# Patient Record
Sex: MALE | Race: BLACK OR AFRICAN AMERICAN | NOT HISPANIC OR LATINO | URBAN - METROPOLITAN AREA
[De-identification: names, ages, dates, MRNs, and addresses within clinical notes are randomized per-mention and may not be internally consistent; named-entity substitution may affect disease eponyms.]

---

## 2021-09-23 ENCOUNTER — OFFICE (OUTPATIENT)
Dept: URBAN - METROPOLITAN AREA TELEHEALTH 7 | Facility: TELEHEALTH | Age: 36
End: 2021-09-23
Payer: COMMERCIAL

## 2021-09-23 VITALS — HEIGHT: 69 IN | WEIGHT: 190 LBS

## 2021-09-23 DIAGNOSIS — R14.0 ABDOMINAL DISTENSION (GASEOUS): ICD-10-CM

## 2021-09-23 DIAGNOSIS — R10.12 LEFT UPPER QUADRANT PAIN: ICD-10-CM

## 2021-09-23 DIAGNOSIS — R07.2 PRECORDIAL PAIN: ICD-10-CM

## 2021-09-23 PROCEDURE — 99204 OFFICE O/P NEW MOD 45 MIN: CPT | Performed by: PHYSICIAN ASSISTANT

## 2021-09-23 RX ORDER — SUCRALFATE ORAL 1 G/10ML
SUSPENSION ORAL
Qty: 800 | Refills: 2 | Status: ACTIVE
Start: 2021-09-23

## 2021-09-23 NOTE — SERVICEHPINOTES
PATIENT VERIFIED BY DATE OF BIRTH AND NAME. Patient has been consented for this telecommunication visit.
larry juarez  GIL MCCARTY   is a   35   year old male who is being seen in consultation at the request of   ARIANA SMITH   for pain after eating. 
larry juarezIn 2017 he was in [place] and had passed out after a day of eating. Was told diverticulitis but he says he thinks there were translation issues, and he denies LLQ pain at that time. He instead was having pain in LUQ area and substernal region. Since then, was having episodes a couple times a month, but became more frequent this year. He has been doing more of a keto diet this year, heavy on animal proteins. larry juarez Currently symptoms are happening 3-4x/week. Symptoms happen within a few minutes after eating. Notes pain in left abdomen, substernal area, and more recently on the right side of his abdomen. Pain is crampy and can feel like a vice . He notices it more with fatty foods, caffeine, coffee. Pain lasts 20-30 minutes.
larry Gutierrez denies heartburn/reflux. He reports daily BMs and denies association of abdominal pains and BMs. Feels worse if he takes pain pills. He does have abdominal bloating. 
larry juarez He had a CT abd/pelvis with IV contrast in April with no evidence of GI findings. No gallstones. He reports an EGD and colonoscopy with GI in [place] - reportedly ok other than hiatal hernia. He has been on omeprazole 40 mg daily since July 10th, and is using dicyclomine 20 mg 1-2x/day. Not sure that these are helping. 
larry juarez Stress levels are high.
